# Patient Record
Sex: FEMALE
[De-identification: names, ages, dates, MRNs, and addresses within clinical notes are randomized per-mention and may not be internally consistent; named-entity substitution may affect disease eponyms.]

---

## 2023-01-03 PROBLEM — Z00.00 ENCOUNTER FOR PREVENTIVE HEALTH EXAMINATION: Status: ACTIVE | Noted: 2023-01-03

## 2023-01-12 ENCOUNTER — APPOINTMENT (OUTPATIENT)
Dept: UROLOGY | Facility: CLINIC | Age: 43
End: 2023-01-12

## 2023-01-18 ENCOUNTER — APPOINTMENT (OUTPATIENT)
Dept: UROLOGY | Facility: CLINIC | Age: 43
End: 2023-01-18
Payer: COMMERCIAL

## 2023-01-18 VITALS
HEIGHT: 69 IN | BODY MASS INDEX: 19.7 KG/M2 | RESPIRATION RATE: 16 BRPM | HEART RATE: 76 BPM | OXYGEN SATURATION: 99 % | SYSTOLIC BLOOD PRESSURE: 113 MMHG | WEIGHT: 133 LBS | DIASTOLIC BLOOD PRESSURE: 74 MMHG | TEMPERATURE: 98.2 F

## 2023-01-18 PROCEDURE — 99203 OFFICE O/P NEW LOW 30 MIN: CPT

## 2023-01-24 ENCOUNTER — APPOINTMENT (OUTPATIENT)
Dept: ENDOCRINOLOGY | Facility: CLINIC | Age: 43
End: 2023-01-24
Payer: COMMERCIAL

## 2023-01-24 VITALS
WEIGHT: 133 LBS | SYSTOLIC BLOOD PRESSURE: 110 MMHG | HEIGHT: 69 IN | TEMPERATURE: 98.1 F | HEART RATE: 78 BPM | BODY MASS INDEX: 19.7 KG/M2 | DIASTOLIC BLOOD PRESSURE: 78 MMHG | OXYGEN SATURATION: 98 %

## 2023-01-24 PROCEDURE — 99204 OFFICE O/P NEW MOD 45 MIN: CPT

## 2023-01-24 NOTE — HISTORY OF PRESENT ILLNESS
[FreeTextEntry1] : 42 year F here for assessment of adrenal adenoma: \par \par Date of Imagin2022\par Modality:  CT KUB, done in outside, left side 2 x 1.3 cm, 78% absolute washout and 48% relative washout\par \par Patient reports that [place]ings [place] when she had round face, thin legs, striae, and had right adrenal adenoma removed \par \par She had interval CT scan done which suggested now a left sided adenoma \par \par History of known prior/ current malignancy: No\par    \par Episodic headaches: No \par Episodic sweating: No\par Forceful palpitations: No\par Tremors: No\par Pallor: No\par Dyspnea: No\par Generalized weakness: No\par Panic attacks: No\par Weight loss: No\par Polyuria: No\par Polydipsia: No\par Constipation: No  \par Known history of elevations in blood pressure related to diagnostic procedures, anesthesia induction, surgery, with certain foods or beverages: No\par \par History of HTN: No\par Renal impairment: No \par \par Appreciable fat buildup in face, neck, back, abdomen, chest: No\par Arms and legs becoming thin: No\par Purple stretch marks: Yes, improving \par \par Women:  \par Hirsutism: No\par Acne: No\par Male-pattern baldness: No\par \par

## 2023-01-24 NOTE — PHYSICAL EXAM
[Alert] : alert [Well Nourished] : well nourished [No Acute Distress] : no acute distress [Well Developed] : well developed [Normal Sclera/Conjunctiva] : normal sclera/conjunctiva [EOMI] : extra ocular movement intact [No Proptosis] : no proptosis [Normal Oropharynx] : the oropharynx was normal [Thyroid Not Enlarged] : the thyroid was not enlarged [No Respiratory Distress] : no respiratory distress [No Accessory Muscle Use] : no accessory muscle use [Clear to Auscultation] : lungs were clear to auscultation bilaterally [Normal S1, S2] : normal S1 and S2 [Normal Rate] : heart rate was normal [Regular Rhythm] : with a regular rhythm [No Edema] : no peripheral edema [Normal Bowel Sounds] : normal bowel sounds [Pedal Pulses Normal] : the pedal pulses are present [Not Tender] : non-tender [Not Distended] : not distended [Soft] : abdomen soft [Normal Anterior Cervical Nodes] : no anterior cervical lymphadenopathy [Normal Posterior Cervical Nodes] : no posterior cervical lymphadenopathy [No Spinal Tenderness] : no spinal tenderness [Spine Straight] : spine straight [No Stigmata of Cushings Syndrome] : no stigmata of Cushings Syndrome [Normal Gait] : normal gait [Normal Strength/Tone] : muscle strength and tone were normal [No Rash] : no rash [Acanthosis Nigricans] : no acanthosis nigricans [Normal Reflexes] : deep tendon reflexes were 2+ and symmetric [No Tremors] : no tremors [Oriented x3] : oriented to person, place, and time

## 2023-01-25 ENCOUNTER — LABORATORY RESULT (OUTPATIENT)
Age: 43
End: 2023-01-25

## 2023-01-25 NOTE — HISTORY OF PRESENT ILLNESS
[FreeTextEntry1] : 43 yo presents today for an initial visit for a Bosniak III cyst\par she presented in [place] and had a CT scan performed and this was diagnosed incidentally\par She is healthy with no other problems\par Referred by Dr Castellon\par no hematuria\par no pain \par no family hx

## 2023-01-26 LAB
ALBUMIN SERPL ELPH-MCNC: 4.5 G/DL
ALDOSTERONE SERUM: 26.1 NG/DL
ALP BLD-CCNC: 65 U/L
ALT SERPL-CCNC: 11 U/L
ANION GAP SERPL CALC-SCNC: 11 MMOL/L
AST SERPL-CCNC: 16 U/L
BILIRUB SERPL-MCNC: 0.6 MG/DL
BUN SERPL-MCNC: 10 MG/DL
CALCIUM SERPL-MCNC: 10 MG/DL
CHLORIDE SERPL-SCNC: 106 MMOL/L
CO2 SERPL-SCNC: 23 MMOL/L
CORTIS SERPL-MCNC: 3.4 UG/DL
CREAT SERPL-MCNC: 0.85 MG/DL
EGFR: 87 ML/MIN/1.73M2
GLUCOSE SERPL-MCNC: 99 MG/DL
POTASSIUM SERPL-SCNC: 5.2 MMOL/L
PROT SERPL-MCNC: 6.5 G/DL
SODIUM SERPL-SCNC: 140 MMOL/L
TSH SERPL-ACNC: 1.12 UIU/ML

## 2023-01-28 ENCOUNTER — TRANSCRIPTION ENCOUNTER (OUTPATIENT)
Age: 43
End: 2023-01-28

## 2023-02-01 LAB — DEXAMETHASONE LEVEL: 116 NG/DL

## 2023-02-07 LAB
ACTH-ESO: 11 PG/ML
DHEA-SULFATE, SERUM: 24 UG/DL
RENIN ACTIVITY, PLASMA: 1.2 NG/ML/HR

## 2023-02-10 ENCOUNTER — APPOINTMENT (OUTPATIENT)
Dept: ENDOCRINOLOGY | Facility: CLINIC | Age: 43
End: 2023-02-10
Payer: COMMERCIAL

## 2023-02-10 VITALS
BODY MASS INDEX: 19.7 KG/M2 | HEIGHT: 69 IN | SYSTOLIC BLOOD PRESSURE: 90 MMHG | HEART RATE: 69 BPM | OXYGEN SATURATION: 99 % | DIASTOLIC BLOOD PRESSURE: 60 MMHG | WEIGHT: 133 LBS | TEMPERATURE: 98.1 F

## 2023-02-10 PROCEDURE — 99214 OFFICE O/P EST MOD 30 MIN: CPT

## 2023-02-10 NOTE — REASON FOR VISIT
[Adrenal Evaluation/Adrenal Disorder] : adrenal evaluation/adrenal disorder [Follow - Up] : a follow-up visit

## 2023-02-22 LAB
METANEPHRINE, PL: 10.6 PG/ML
NORMETANEPHRINE, PL: 73.1 PG/ML

## 2023-02-24 ENCOUNTER — OUTPATIENT (OUTPATIENT)
Dept: OUTPATIENT SERVICES | Facility: HOSPITAL | Age: 43
LOS: 1 days | End: 2023-02-24
Payer: COMMERCIAL

## 2023-02-24 ENCOUNTER — RESULT REVIEW (OUTPATIENT)
Age: 43
End: 2023-02-24

## 2023-02-24 VITALS
HEIGHT: 69 IN | WEIGHT: 130.07 LBS | SYSTOLIC BLOOD PRESSURE: 111 MMHG | RESPIRATION RATE: 16 BRPM | HEART RATE: 70 BPM | DIASTOLIC BLOOD PRESSURE: 80 MMHG | TEMPERATURE: 98 F | OXYGEN SATURATION: 100 %

## 2023-02-24 DIAGNOSIS — E89.6 POSTPROCEDURAL ADRENOCORTICAL (-MEDULLARY) HYPOFUNCTION: Chronic | ICD-10-CM

## 2023-02-24 DIAGNOSIS — Z01.818 ENCOUNTER FOR OTHER PREPROCEDURAL EXAMINATION: ICD-10-CM

## 2023-02-24 DIAGNOSIS — N28.89 OTHER SPECIFIED DISORDERS OF KIDNEY AND URETER: ICD-10-CM

## 2023-02-24 LAB
ABO RH CONFIRMATION: SIGNIFICANT CHANGE UP
ANION GAP SERPL CALC-SCNC: 3 MMOL/L — LOW (ref 5–17)
APPEARANCE UR: CLEAR — SIGNIFICANT CHANGE UP
APTT BLD: 34.4 SEC — SIGNIFICANT CHANGE UP (ref 27.5–35.5)
BACTERIA # UR AUTO: ABNORMAL
BASOPHILS # BLD AUTO: 0.07 K/UL — SIGNIFICANT CHANGE UP (ref 0–0.2)
BASOPHILS NFR BLD AUTO: 1.2 % — SIGNIFICANT CHANGE UP (ref 0–2)
BILIRUB UR-MCNC: NEGATIVE — SIGNIFICANT CHANGE UP
BLD GP AB SCN SERPL QL: SIGNIFICANT CHANGE UP
BUN SERPL-MCNC: 12 MG/DL — SIGNIFICANT CHANGE UP (ref 7–23)
CALCIUM SERPL-MCNC: 9.7 MG/DL — SIGNIFICANT CHANGE UP (ref 8.5–10.1)
CHLORIDE SERPL-SCNC: 107 MMOL/L — SIGNIFICANT CHANGE UP (ref 96–108)
CO2 SERPL-SCNC: 28 MMOL/L — SIGNIFICANT CHANGE UP (ref 22–31)
COLOR SPEC: YELLOW — SIGNIFICANT CHANGE UP
CREAT SERPL-MCNC: 0.78 MG/DL — SIGNIFICANT CHANGE UP (ref 0.5–1.3)
DIFF PNL FLD: ABNORMAL
EGFR: 96 ML/MIN/1.73M2 — SIGNIFICANT CHANGE UP
EOSINOPHIL # BLD AUTO: 0.16 K/UL — SIGNIFICANT CHANGE UP (ref 0–0.5)
EOSINOPHIL NFR BLD AUTO: 2.7 % — SIGNIFICANT CHANGE UP (ref 0–6)
EPI CELLS # UR: SIGNIFICANT CHANGE UP
GLUCOSE SERPL-MCNC: 79 MG/DL — SIGNIFICANT CHANGE UP (ref 70–99)
GLUCOSE UR QL: NEGATIVE — SIGNIFICANT CHANGE UP
HCT VFR BLD CALC: 40.7 % — SIGNIFICANT CHANGE UP (ref 34.5–45)
HGB BLD-MCNC: 13.4 G/DL — SIGNIFICANT CHANGE UP (ref 11.5–15.5)
IMM GRANULOCYTES NFR BLD AUTO: 0.3 % — SIGNIFICANT CHANGE UP (ref 0–0.9)
INR BLD: 1.01 RATIO — SIGNIFICANT CHANGE UP (ref 0.88–1.16)
KETONES UR-MCNC: NEGATIVE — SIGNIFICANT CHANGE UP
LEUKOCYTE ESTERASE UR-ACNC: ABNORMAL
LYMPHOCYTES # BLD AUTO: 2.1 K/UL — SIGNIFICANT CHANGE UP (ref 1–3.3)
LYMPHOCYTES # BLD AUTO: 34.9 % — SIGNIFICANT CHANGE UP (ref 13–44)
MCHC RBC-ENTMCNC: 31.4 PG — SIGNIFICANT CHANGE UP (ref 27–34)
MCHC RBC-ENTMCNC: 32.9 GM/DL — SIGNIFICANT CHANGE UP (ref 32–36)
MCV RBC AUTO: 95.3 FL — SIGNIFICANT CHANGE UP (ref 80–100)
MONOCYTES # BLD AUTO: 0.67 K/UL — SIGNIFICANT CHANGE UP (ref 0–0.9)
MONOCYTES NFR BLD AUTO: 11.1 % — SIGNIFICANT CHANGE UP (ref 2–14)
NEUTROPHILS # BLD AUTO: 3 K/UL — SIGNIFICANT CHANGE UP (ref 1.8–7.4)
NEUTROPHILS NFR BLD AUTO: 49.8 % — SIGNIFICANT CHANGE UP (ref 43–77)
NITRITE UR-MCNC: NEGATIVE — SIGNIFICANT CHANGE UP
PH UR: 7 — SIGNIFICANT CHANGE UP (ref 5–8)
PLATELET # BLD AUTO: 246 K/UL — SIGNIFICANT CHANGE UP (ref 150–400)
POTASSIUM SERPL-MCNC: 3.5 MMOL/L — SIGNIFICANT CHANGE UP (ref 3.5–5.3)
POTASSIUM SERPL-SCNC: 3.5 MMOL/L — SIGNIFICANT CHANGE UP (ref 3.5–5.3)
PROT UR-MCNC: NEGATIVE — SIGNIFICANT CHANGE UP
PROTHROM AB SERPL-ACNC: 11.7 SEC — SIGNIFICANT CHANGE UP (ref 10.5–13.4)
RBC # BLD: 4.27 M/UL — SIGNIFICANT CHANGE UP (ref 3.8–5.2)
RBC # FLD: 12.1 % — SIGNIFICANT CHANGE UP (ref 10.3–14.5)
RBC CASTS # UR COMP ASSIST: SIGNIFICANT CHANGE UP /HPF (ref 0–4)
SODIUM SERPL-SCNC: 138 MMOL/L — SIGNIFICANT CHANGE UP (ref 135–145)
SP GR SPEC: 1 — LOW (ref 1.01–1.02)
UROBILINOGEN FLD QL: NEGATIVE — SIGNIFICANT CHANGE UP
WBC # BLD: 6.02 K/UL — SIGNIFICANT CHANGE UP (ref 3.8–10.5)
WBC # FLD AUTO: 6.02 K/UL — SIGNIFICANT CHANGE UP (ref 3.8–10.5)
WBC UR QL: SIGNIFICANT CHANGE UP /HPF (ref 0–5)

## 2023-02-24 PROCEDURE — 86901 BLOOD TYPING SEROLOGIC RH(D): CPT

## 2023-02-24 PROCEDURE — 93005 ELECTROCARDIOGRAM TRACING: CPT

## 2023-02-24 PROCEDURE — 85610 PROTHROMBIN TIME: CPT

## 2023-02-24 PROCEDURE — 87086 URINE CULTURE/COLONY COUNT: CPT

## 2023-02-24 PROCEDURE — 86900 BLOOD TYPING SEROLOGIC ABO: CPT

## 2023-02-24 PROCEDURE — 81001 URINALYSIS AUTO W/SCOPE: CPT

## 2023-02-24 PROCEDURE — 99214 OFFICE O/P EST MOD 30 MIN: CPT | Mod: 25

## 2023-02-24 PROCEDURE — 93010 ELECTROCARDIOGRAM REPORT: CPT

## 2023-02-24 PROCEDURE — 71046 X-RAY EXAM CHEST 2 VIEWS: CPT | Mod: 26

## 2023-02-24 PROCEDURE — 80048 BASIC METABOLIC PNL TOTAL CA: CPT

## 2023-02-24 PROCEDURE — 85025 COMPLETE CBC W/AUTO DIFF WBC: CPT

## 2023-02-24 PROCEDURE — 71046 X-RAY EXAM CHEST 2 VIEWS: CPT

## 2023-02-24 PROCEDURE — 85730 THROMBOPLASTIN TIME PARTIAL: CPT

## 2023-02-24 PROCEDURE — 36415 COLL VENOUS BLD VENIPUNCTURE: CPT

## 2023-02-24 PROCEDURE — 86850 RBC ANTIBODY SCREEN: CPT

## 2023-02-24 NOTE — H&P PST ADULT - HISTORY OF PRESENT ILLNESS
42 year old female diagnosed with left renal mass presents to PST for planned left partial nephrectomy  42 year old female diagnosed with left renal mass; incidental finding; denies abdominal pain;  presents to PST for planned left partial nephrectomy

## 2023-02-24 NOTE — H&P PST ADULT - ASSESSMENT
Plan:  1. PST instructions given ; NPO status/  instructions to be given by ASU   2. Pt instructed to take following meds on day of surgery:   3. Pt instructed to take routine evening medications unless indicated   4. Stop NSAIDS ( Aspirin Alev Motrin Mobic Diclofenac), herbal supplements , MVI , Vitamin fish oil 7 days prior to surgery  unless   directed by surgeon or cardiologist;   5. Medical Optimization  with    6. EZ wash instructions given & mupirocin instructions given  7. Labs EKG CXR as per surgeon request   8. Pt instructed to self quarantine after Covid test   9. Covid Testing scheduled Pt notified and aware  10. Pt denies covid symptoms shortness of breath fever cough    42 year old female diagnosed with left renal mass; incidental finding; denies abdominal pain;  presents to PST for planned left partial nephrectomy       Plan:  1. PST instructions given ; NPO status/  instructions to be given by ASU   2. Pt instructed to take following meds on day of surgery: none  3. Pt instructed to take routine evening medications unless indicated   4. Stop NSAIDS ( Aspirin Alev Motrin Mobic Diclofenac), herbal supplements , MVI , Vitamin fish oil 7 days prior to surgery  unless   directed by surgeon or cardiologist;   5. Medical Optimization  with Dr Temple  6. EZ wash instructions given   7. Labs EKG CXR as per surgeon request   8. Pt instructed to self quarantine after Covid test   9. Covid Testing scheduled Pt notified and aware  10. Pt denies covid symptoms shortness of breath fever cough   11. Urine for pregnancy on day of surgery       CAPRINI SCORE [CLOT]    AGE RELATED RISK FACTORS                                                       MOBILITY RELATED FACTORS  [x ] Age 41-60 years                                            (1 Point)                  [ ] Bed rest                                                        (1 Point)  [ ] Age: 61-74 years                                           (2 Points)                 [ ] Plaster cast                                                   (2 Points)  [ ] Age= 75 years                                              (3 Points)                 [ ] Bed bound for more than 72 hours                 (2 Points)    DISEASE RELATED RISK FACTORS                                               GENDER SPECIFIC FACTORS  [ ] Edema in the lower extremities                       (1 Point)                  [ ] Pregnancy                                                     (1 Point)  [ ] Varicose veins                                               (1 Point)                  [ ] Post-partum < 6 weeks                                   (1 Point)             [ ] BMI > 25 Kg/m2                                            (1 Point)                  [ ] Hormonal therapy  or oral contraception          (1 Point)                 [ ] Sepsis (in the previous month)                        (1 Point)                  [ ] History of pregnancy complications                 (1 point)  [ ] Pneumonia or serious lung disease                                               [ ] Unexplained or recurrent                     (1 Point)           (in the previous month)                               (1 Point)  [ ] Abnormal pulmonary function test                     (1 Point)                 SURGERY RELATED RISK FACTORS  [ ] Acute myocardial infarction                              (1 Point)                 [ ]  Section                                             (1 Point)  [ ] Congestive heart failure (in the previous month)  (1 Point)               [ ] Minor surgery                                                  (1 Point)   [ ] Inflammatory bowel disease                             (1 Point)                 [ ] Arthroscopic surgery                                        (2 Points)  [ ] Central venous access                                      (2 Points)                [x ] General surgery lasting more than 45 minutes   (2 Points)       [ ] Stroke (in the previous month)                          (5 Points)               [ ] Elective arthroplasty                                         (5 Points)            (x ) presents and past malignancy                           (2 points)                                                                                                         HEMATOLOGY RELATED FACTORS                                                 TRAUMA RELATED RISK FACTORS  [ ] Prior episodes of VTE                                     (3 Points)                 [ ] Fracture of the hip, pelvis, or leg                       (5 Points)  [ ] Positive family history for VTE                         (3 Points)                 [ ] Acute spinal cord injury (in the previous month)  (5 Points)  [ ] Prothrombin 90020 A                                     (3 Points)                 [ ] Paralysis  (less than 1 month)                             (5 Points)  [ ] Factor V Leiden                                             (3 Points)                  [ ] Multiple Trauma within 1 month                        (5 Points)  [ ] Lupus anticoagulants                                     (3 Points)                                                           [ ] Anticardiolipin antibodies                               (3 Points)                                                       [ ] High homocysteine in the blood                      (3 Points)                                             [ ] Other congenital or acquired thrombophilia      (3 Points)                                                [ ] Heparin induced thrombocytopenia                  (3 Points)                                          Total Score [    5      ]    The Caprini score indicates this patient is at risk for a VTE event (score 3-5).  Most surgical patients in this group would benefit from pharmacologic prophylaxis.  The surgical team will determine the balance between VTE risk and bleeding risk

## 2023-02-24 NOTE — H&P PST ADULT - NSICDXFAMILYHX_GEN_ALL_CORE_FT
FAMILY HISTORY:  Father  Still living? Unknown  Family history of cirrhosis of liver, Age at diagnosis: Age Unknown

## 2023-02-24 NOTE — H&P PST ADULT - NSANTHOSAYNRD_GEN_A_CORE
No. MARIELLA screening performed.  STOP BANG Legend: 0-2 = LOW Risk; 3-4 = INTERMEDIATE Risk; 5-8 = HIGH Risk

## 2023-02-24 NOTE — H&P PST ADULT - NSICDXPASTMEDICALHX_GEN_ALL_CORE_FT
PAST MEDICAL HISTORY:  History of adrenal adenoma     Left renal mass      PAST MEDICAL HISTORY:  Adrenal adenoma, left     Adrenal adenoma, right     History of Cushing disease     Left renal mass     Neck pain     Scalp psoriasis

## 2023-02-25 DIAGNOSIS — N28.89 OTHER SPECIFIED DISORDERS OF KIDNEY AND URETER: ICD-10-CM

## 2023-02-25 DIAGNOSIS — Z01.818 ENCOUNTER FOR OTHER PREPROCEDURAL EXAMINATION: ICD-10-CM

## 2023-02-25 LAB
CULTURE RESULTS: SIGNIFICANT CHANGE UP
SPECIMEN SOURCE: SIGNIFICANT CHANGE UP

## 2023-03-01 LAB
CORTIS 24H UR-MCNC: 24 H
CORTIS 24H UR-MRATE: 11 MCG/24 H
SPECIMEN VOL 24H UR: 1450 ML

## 2023-03-01 NOTE — ADDENDUM
[FreeTextEntry1] : 41Y/O female with left sided 2 cm adrenal adenoma, with imaging phenotype suggestive of benign adrenal adenoma. Patient [place]x of [place]ushings [place], with right sided adenoma removal > 5 years ago. 1mg DST did not demonstrate full suppression. Clinically does not appear cushingoid, possibly false +ve vs subclinical cushings. \par Not hypertensive with normal electrolytes, and renin not suppressed hence low suspicion for primary aldosterone excess. Plasma metanephrines within normal limits and not suggestive of catecholamine excess. \par \par \par Patient currently stable from Endocrine standpoint to proceed to elective urologic procedure\par \par \par -will continue f/up in office post procedure for interval monitoring of adrenal nodule as above. \par \par \par \par \par \par 03/01/2023  8:46 AM \par \par \par Contacted Ms. AZUCENA HOPKINS  at telephone number listed on file to review results of 24hr urine  done on 2/21/23. Patient agrees to review over phone\par \par 24Hr urine cortisol WNL, and not suggestive of cortisol excess\par \par likely non-functional adrenal adenoma, with benign imaging phenotype\par \par -repeat CT scan adrenal protocol 1/2024 or earlier if indicated\par -repeat hormonal profile 1/2024, or sooner if new clinical features\par \par f/up 1/2024\par     \par

## 2023-03-01 NOTE — HISTORY OF PRESENT ILLNESS
[FreeTextEntry1] : 42 year F here for f/up of adrenal adenoma: \par \par Summary:\par Date of Imagin2022\par Modality:  CT KUB, done in outside, left side 2 x 1.3 cm, 78% absolute washout and 48% relative washout\par \par Patient reports that she [place] when she had round face, thin legs, striae, and had right adrenal adenoma removed \par \par She had interval CT scan done which suggested now a left sided adenoma \par \par \par History of HTN: No\par \par Appreciable fat buildup in face, neck, back, abdomen, chest: No\par Arms and legs becoming thin: No\par Purple stretch marks: Yes, improving \par Hirsutism: No\par Acne: No\par Male-pattern baldness: No\par \par At last visit repeat labs were ordered, including 1mg DST\par \par

## 2023-03-01 NOTE — PHYSICAL EXAM
[Alert] : alert [Well Nourished] : well nourished [No Acute Distress] : no acute distress [Well Developed] : well developed [Normal Sclera/Conjunctiva] : normal sclera/conjunctiva [EOMI] : extra ocular movement intact [No Proptosis] : no proptosis [Normal Oropharynx] : the oropharynx was normal [Thyroid Not Enlarged] : the thyroid was not enlarged [No Respiratory Distress] : no respiratory distress [No Accessory Muscle Use] : no accessory muscle use [Clear to Auscultation] : lungs were clear to auscultation bilaterally [Normal S1, S2] : normal S1 and S2 [Normal Rate] : heart rate was normal [Regular Rhythm] : with a regular rhythm [No Edema] : no peripheral edema [Pedal Pulses Normal] : the pedal pulses are present [Normal Bowel Sounds] : normal bowel sounds [Not Tender] : non-tender [Not Distended] : not distended [Soft] : abdomen soft [Normal Anterior Cervical Nodes] : no anterior cervical lymphadenopathy [No Spinal Tenderness] : no spinal tenderness [Spine Straight] : spine straight [No Stigmata of Cushings Syndrome] : no stigmata of Cushings Syndrome [Normal Gait] : normal gait [Normal Strength/Tone] : muscle strength and tone were normal [No Rash] : no rash [Normal Reflexes] : deep tendon reflexes were 2+ and symmetric [No Tremors] : no tremors [Oriented x3] : oriented to person, place, and time [Acanthosis Nigricans] : no acanthosis nigricans

## 2023-03-02 LAB — BACTERIA UR CULT: NORMAL

## 2023-03-03 PROBLEM — M54.2 CERVICALGIA: Chronic | Status: ACTIVE | Noted: 2023-02-24

## 2023-03-03 PROBLEM — L40.9 PSORIASIS, UNSPECIFIED: Chronic | Status: ACTIVE | Noted: 2023-02-24

## 2023-03-03 PROBLEM — D35.01 BENIGN NEOPLASM OF RIGHT ADRENAL GLAND: Chronic | Status: ACTIVE | Noted: 2023-02-24

## 2023-03-03 PROBLEM — D35.02 BENIGN NEOPLASM OF LEFT ADRENAL GLAND: Chronic | Status: ACTIVE | Noted: 2023-02-24

## 2023-03-03 PROBLEM — Z86.39 PERSONAL HISTORY OF OTHER ENDOCRINE, NUTRITIONAL AND METABOLIC DISEASE: Chronic | Status: ACTIVE | Noted: 2023-02-24

## 2023-03-03 PROBLEM — N28.89 OTHER SPECIFIED DISORDERS OF KIDNEY AND URETER: Chronic | Status: ACTIVE | Noted: 2023-02-24

## 2023-03-07 ENCOUNTER — APPOINTMENT (OUTPATIENT)
Dept: UROLOGY | Facility: HOSPITAL | Age: 43
End: 2023-03-07

## 2023-03-07 ENCOUNTER — INPATIENT (INPATIENT)
Facility: HOSPITAL | Age: 43
LOS: 1 days | Discharge: HOME | DRG: 688 | End: 2023-03-09
Attending: UROLOGY | Admitting: UROLOGY
Payer: COMMERCIAL

## 2023-03-07 ENCOUNTER — RESULT REVIEW (OUTPATIENT)
Age: 43
End: 2023-03-07

## 2023-03-07 VITALS
HEART RATE: 72 BPM | DIASTOLIC BLOOD PRESSURE: 64 MMHG | TEMPERATURE: 98 F | OXYGEN SATURATION: 100 % | HEIGHT: 69 IN | RESPIRATION RATE: 16 BRPM | SYSTOLIC BLOOD PRESSURE: 106 MMHG | WEIGHT: 130.07 LBS

## 2023-03-07 DIAGNOSIS — N28.89 OTHER SPECIFIED DISORDERS OF KIDNEY AND URETER: ICD-10-CM

## 2023-03-07 DIAGNOSIS — E89.6 POSTPROCEDURAL ADRENOCORTICAL (-MEDULLARY) HYPOFUNCTION: Chronic | ICD-10-CM

## 2023-03-07 LAB
ANION GAP SERPL CALC-SCNC: 4 MMOL/L — LOW (ref 5–17)
BASOPHILS # BLD AUTO: 0.06 K/UL — SIGNIFICANT CHANGE UP (ref 0–0.2)
BASOPHILS NFR BLD AUTO: 0.6 % — SIGNIFICANT CHANGE UP (ref 0–2)
BUN SERPL-MCNC: 11 MG/DL — SIGNIFICANT CHANGE UP (ref 7–23)
CALCIUM SERPL-MCNC: 9 MG/DL — SIGNIFICANT CHANGE UP (ref 8.5–10.1)
CHLORIDE SERPL-SCNC: 110 MMOL/L — HIGH (ref 96–108)
CO2 SERPL-SCNC: 25 MMOL/L — SIGNIFICANT CHANGE UP (ref 22–31)
CREAT SERPL-MCNC: 0.93 MG/DL — SIGNIFICANT CHANGE UP (ref 0.5–1.3)
EGFR: 78 ML/MIN/1.73M2 — SIGNIFICANT CHANGE UP
EOSINOPHIL # BLD AUTO: 0.02 K/UL — SIGNIFICANT CHANGE UP (ref 0–0.5)
EOSINOPHIL NFR BLD AUTO: 0.2 % — SIGNIFICANT CHANGE UP (ref 0–6)
GLUCOSE SERPL-MCNC: 105 MG/DL — HIGH (ref 70–99)
HCG UR QL: NEGATIVE — SIGNIFICANT CHANGE UP
HCT VFR BLD CALC: 39.3 % — SIGNIFICANT CHANGE UP (ref 34.5–45)
HGB BLD-MCNC: 12.9 G/DL — SIGNIFICANT CHANGE UP (ref 11.5–15.5)
IMM GRANULOCYTES NFR BLD AUTO: 0.4 % — SIGNIFICANT CHANGE UP (ref 0–0.9)
LYMPHOCYTES # BLD AUTO: 0.74 K/UL — LOW (ref 1–3.3)
LYMPHOCYTES # BLD AUTO: 7.1 % — LOW (ref 13–44)
MCHC RBC-ENTMCNC: 31.2 PG — SIGNIFICANT CHANGE UP (ref 27–34)
MCHC RBC-ENTMCNC: 32.8 GM/DL — SIGNIFICANT CHANGE UP (ref 32–36)
MCV RBC AUTO: 94.9 FL — SIGNIFICANT CHANGE UP (ref 80–100)
MONOCYTES # BLD AUTO: 0.13 K/UL — SIGNIFICANT CHANGE UP (ref 0–0.9)
MONOCYTES NFR BLD AUTO: 1.3 % — LOW (ref 2–14)
NEUTROPHILS # BLD AUTO: 9.39 K/UL — HIGH (ref 1.8–7.4)
NEUTROPHILS NFR BLD AUTO: 90.4 % — HIGH (ref 43–77)
PLATELET # BLD AUTO: 190 K/UL — SIGNIFICANT CHANGE UP (ref 150–400)
POTASSIUM SERPL-MCNC: 4.6 MMOL/L — SIGNIFICANT CHANGE UP (ref 3.5–5.3)
POTASSIUM SERPL-SCNC: 4.6 MMOL/L — SIGNIFICANT CHANGE UP (ref 3.5–5.3)
RBC # BLD: 4.14 M/UL — SIGNIFICANT CHANGE UP (ref 3.8–5.2)
RBC # FLD: 11.9 % — SIGNIFICANT CHANGE UP (ref 10.3–14.5)
SODIUM SERPL-SCNC: 139 MMOL/L — SIGNIFICANT CHANGE UP (ref 135–145)
WBC # BLD: 10.38 K/UL — SIGNIFICANT CHANGE UP (ref 3.8–10.5)
WBC # FLD AUTO: 10.38 K/UL — SIGNIFICANT CHANGE UP (ref 3.8–10.5)

## 2023-03-07 PROCEDURE — 85025 COMPLETE CBC W/AUTO DIFF WBC: CPT

## 2023-03-07 PROCEDURE — 80048 BASIC METABOLIC PNL TOTAL CA: CPT

## 2023-03-07 PROCEDURE — 84100 ASSAY OF PHOSPHORUS: CPT

## 2023-03-07 PROCEDURE — 85027 COMPLETE CBC AUTOMATED: CPT

## 2023-03-07 PROCEDURE — 88307 TISSUE EXAM BY PATHOLOGIST: CPT

## 2023-03-07 PROCEDURE — 50543 LAPARO PARTIAL NEPHRECTOMY: CPT | Mod: LT

## 2023-03-07 PROCEDURE — C1889: CPT

## 2023-03-07 PROCEDURE — 83735 ASSAY OF MAGNESIUM: CPT

## 2023-03-07 PROCEDURE — 36415 COLL VENOUS BLD VENIPUNCTURE: CPT

## 2023-03-07 PROCEDURE — 50543 LAPARO PARTIAL NEPHRECTOMY: CPT | Mod: AS,LT

## 2023-03-07 PROCEDURE — C9113: CPT

## 2023-03-07 PROCEDURE — 88307 TISSUE EXAM BY PATHOLOGIST: CPT | Mod: 26

## 2023-03-07 PROCEDURE — 76998 US GUIDE INTRAOP: CPT | Mod: 26,LT

## 2023-03-07 PROCEDURE — S2900: CPT

## 2023-03-07 PROCEDURE — 81025 URINE PREGNANCY TEST: CPT

## 2023-03-07 RX ORDER — MORPHINE SULFATE 50 MG/1
4 CAPSULE, EXTENDED RELEASE ORAL EVERY 4 HOURS
Refills: 0 | Status: DISCONTINUED | OUTPATIENT
Start: 2023-03-07 | End: 2023-03-09

## 2023-03-07 RX ORDER — ONDANSETRON 8 MG/1
4 TABLET, FILM COATED ORAL EVERY 6 HOURS
Refills: 0 | Status: DISCONTINUED | OUTPATIENT
Start: 2023-03-07 | End: 2023-03-09

## 2023-03-07 RX ORDER — SODIUM CHLORIDE 9 MG/ML
1000 INJECTION INTRAMUSCULAR; INTRAVENOUS; SUBCUTANEOUS
Refills: 0 | Status: DISCONTINUED | OUTPATIENT
Start: 2023-03-07 | End: 2023-03-09

## 2023-03-07 RX ORDER — ONDANSETRON 8 MG/1
4 TABLET, FILM COATED ORAL ONCE
Refills: 0 | Status: DISCONTINUED | OUTPATIENT
Start: 2023-03-07 | End: 2023-03-07

## 2023-03-07 RX ORDER — SODIUM CHLORIDE 9 MG/ML
1000 INJECTION, SOLUTION INTRAVENOUS
Refills: 0 | Status: DISCONTINUED | OUTPATIENT
Start: 2023-03-07 | End: 2023-03-07

## 2023-03-07 RX ORDER — OXYCODONE HYDROCHLORIDE 5 MG/1
5 TABLET ORAL EVERY 6 HOURS
Refills: 0 | Status: DISCONTINUED | OUTPATIENT
Start: 2023-03-07 | End: 2023-03-09

## 2023-03-07 RX ORDER — OXYCODONE HYDROCHLORIDE 5 MG/1
5 TABLET ORAL ONCE
Refills: 0 | Status: DISCONTINUED | OUTPATIENT
Start: 2023-03-07 | End: 2023-03-07

## 2023-03-07 RX ORDER — HEPARIN SODIUM 5000 [USP'U]/ML
5000 INJECTION INTRAVENOUS; SUBCUTANEOUS EVERY 8 HOURS
Refills: 0 | Status: DISCONTINUED | OUTPATIENT
Start: 2023-03-07 | End: 2023-03-09

## 2023-03-07 RX ORDER — CEFAZOLIN SODIUM 1 G
2000 VIAL (EA) INJECTION EVERY 8 HOURS
Refills: 0 | Status: COMPLETED | OUTPATIENT
Start: 2023-03-07 | End: 2023-03-08

## 2023-03-07 RX ORDER — FENTANYL CITRATE 50 UG/ML
50 INJECTION INTRAVENOUS
Refills: 0 | Status: DISCONTINUED | OUTPATIENT
Start: 2023-03-07 | End: 2023-03-07

## 2023-03-07 RX ADMIN — SODIUM CHLORIDE 125 MILLILITER(S): 9 INJECTION INTRAMUSCULAR; INTRAVENOUS; SUBCUTANEOUS at 18:50

## 2023-03-07 RX ADMIN — OXYCODONE HYDROCHLORIDE 5 MILLIGRAM(S): 5 TABLET ORAL at 15:23

## 2023-03-07 RX ADMIN — MORPHINE SULFATE 4 MILLIGRAM(S): 50 CAPSULE, EXTENDED RELEASE ORAL at 23:18

## 2023-03-07 RX ADMIN — Medication 100 MILLIGRAM(S): at 21:27

## 2023-03-07 RX ADMIN — MORPHINE SULFATE 4 MILLIGRAM(S): 50 CAPSULE, EXTENDED RELEASE ORAL at 18:18

## 2023-03-07 RX ADMIN — FENTANYL CITRATE 50 MICROGRAM(S): 50 INJECTION INTRAVENOUS at 15:44

## 2023-03-07 RX ADMIN — MORPHINE SULFATE 4 MILLIGRAM(S): 50 CAPSULE, EXTENDED RELEASE ORAL at 18:03

## 2023-03-07 RX ADMIN — HEPARIN SODIUM 5000 UNIT(S): 5000 INJECTION INTRAVENOUS; SUBCUTANEOUS at 21:27

## 2023-03-07 RX ADMIN — OXYCODONE HYDROCHLORIDE 5 MILLIGRAM(S): 5 TABLET ORAL at 15:44

## 2023-03-07 RX ADMIN — FENTANYL CITRATE 50 MICROGRAM(S): 50 INJECTION INTRAVENOUS at 15:25

## 2023-03-07 RX ADMIN — FENTANYL CITRATE 50 MICROGRAM(S): 50 INJECTION INTRAVENOUS at 16:51

## 2023-03-07 RX ADMIN — FENTANYL CITRATE 50 MICROGRAM(S): 50 INJECTION INTRAVENOUS at 16:45

## 2023-03-07 RX ADMIN — MORPHINE SULFATE 4 MILLIGRAM(S): 50 CAPSULE, EXTENDED RELEASE ORAL at 22:48

## 2023-03-07 RX ADMIN — ONDANSETRON 4 MILLIGRAM(S): 8 TABLET, FILM COATED ORAL at 22:17

## 2023-03-07 NOTE — PATIENT PROFILE ADULT - FALL HARM RISK - PATIENT NEEDS ASSISTANCE
DATE OF ADMISSION:  03/01/2017    HISTORY OF PRESENT ILLNESS:  The patient is a 89-year-old white male with history of known CAD, history of COPD, history of CABG, history of PCI stent; the patient now presents with increasing shortness of breath at rest, having been treated for a COPD exacerbation as outpatient with oral prednisone and oral antibiotics.  The patient still has productive cough and fever, increased weakness, decreased p.o. intake.  Chest x-ray revealing no significant pneumonia.    MEDICATIONS:  Include Lasix 20 b.i.d., prednisone 20 b.i.d., Keflex 500 t.i.d., Ambien 5 p.r.n. q.h.s., aspirin daily, Ventolin inhaler t.i.d.    SOCIAL HISTORY:  Present nonsmoker and nondrinker.  No significant hereditary familial disease.    FAMILY HISTORY:  No family history of CAD or CVA.    PAST MEDICAL HISTORY:  DVT, history of PE approximately 1 year ago, history of MI, status post _______, cataract surgery, history of hypertension, and history of hyperlipidemia.    REVIEW OF SYSTEMS:  Recent fevers as noted above.  No chills.  10 pound weight loss over the last 1 year.    PHYSICAL EXAMINATION:    VITAL SIGNS:  /70, pulse 90, respirations 20, 102 temp in the ER noted orally.       HEAD AND NECK:  Unremarkable.       LUNGS:  Decreased breath sounds.  Crackles at the bases.  Increased wheezing inspiratory.     HEART:  Regular rate and rhythm.  No murmur.  Tachy.       ABDOMEN:  Soft, nontender.       EXTREMITIES:  Without edema.     GENERAL:  Alert and oriented x3.  Mild dementia.     MENTAL STATUS:  No focal neurological deficits.  Gait is not assessed secondary to shortness of breath.  Sensory exam grossly intact to light touch, upper and lower extremities.  Motor is 5-/5 in the upper extremities, 5-/5 on the lower extremities.    ASSESSMENT AND PLAN:    1. Chronic obstructive pulmonary disease exacerbation.  We will give IV steroids, IV antibiotics.  Follow up chest x-ray.  Keith.  Pulmonary to  see in consultation.    2. History of known coronary artery disease.  Monitor on telemetry.  Review last echocardiogram.  Cardiology did also seen in consultation.    3. History of deep venous thrombosis and pulmonary embolism.  Follow up venous Doppler once stable.  4. Hyperlipidemia, statin therapy.    5. Deep venous thrombosis prophylaxis, SCD boots bilateral at present.        ______________________________  Wilton Beverly MD  [id number]      D:  03/02/2017 09:21:08  T:  03/02/2017 10:04:38   /modl   Job:  [id number]          No assistance needed

## 2023-03-07 NOTE — PATIENT PROFILE ADULT - HOW PATIENT ADDRESSED, PROFILE
Perla Hydroxyzine Pregnancy And Lactation Text: This medication is not safe during pregnancy and should not be taken. It is also excreted in breast milk and breast feeding isn't recommended.

## 2023-03-08 ENCOUNTER — APPOINTMENT (OUTPATIENT)
Dept: UROLOGY | Facility: HOSPITAL | Age: 43
End: 2023-03-08

## 2023-03-08 LAB
ANION GAP SERPL CALC-SCNC: 5 MMOL/L — SIGNIFICANT CHANGE UP (ref 5–17)
BASOPHILS # BLD AUTO: 0.02 K/UL — SIGNIFICANT CHANGE UP (ref 0–0.2)
BASOPHILS NFR BLD AUTO: 0.2 % — SIGNIFICANT CHANGE UP (ref 0–2)
BUN SERPL-MCNC: 12 MG/DL — SIGNIFICANT CHANGE UP (ref 7–23)
CALCIUM SERPL-MCNC: 8.2 MG/DL — LOW (ref 8.5–10.1)
CHLORIDE SERPL-SCNC: 112 MMOL/L — HIGH (ref 96–108)
CO2 SERPL-SCNC: 23 MMOL/L — SIGNIFICANT CHANGE UP (ref 22–31)
CREAT SERPL-MCNC: 0.81 MG/DL — SIGNIFICANT CHANGE UP (ref 0.5–1.3)
EGFR: 92 ML/MIN/1.73M2 — SIGNIFICANT CHANGE UP
EOSINOPHIL # BLD AUTO: 0.01 K/UL — SIGNIFICANT CHANGE UP (ref 0–0.5)
EOSINOPHIL NFR BLD AUTO: 0.1 % — SIGNIFICANT CHANGE UP (ref 0–6)
GLUCOSE SERPL-MCNC: 116 MG/DL — HIGH (ref 70–99)
HCT VFR BLD CALC: 32.2 % — LOW (ref 34.5–45)
HGB BLD-MCNC: 10.9 G/DL — LOW (ref 11.5–15.5)
IMM GRANULOCYTES NFR BLD AUTO: 0.6 % — SIGNIFICANT CHANGE UP (ref 0–0.9)
LYMPHOCYTES # BLD AUTO: 1.19 K/UL — SIGNIFICANT CHANGE UP (ref 1–3.3)
LYMPHOCYTES # BLD AUTO: 13.3 % — SIGNIFICANT CHANGE UP (ref 13–44)
MCHC RBC-ENTMCNC: 31.5 PG — SIGNIFICANT CHANGE UP (ref 27–34)
MCHC RBC-ENTMCNC: 33.9 GM/DL — SIGNIFICANT CHANGE UP (ref 32–36)
MCV RBC AUTO: 93.1 FL — SIGNIFICANT CHANGE UP (ref 80–100)
MONOCYTES # BLD AUTO: 0.74 K/UL — SIGNIFICANT CHANGE UP (ref 0–0.9)
MONOCYTES NFR BLD AUTO: 8.3 % — SIGNIFICANT CHANGE UP (ref 2–14)
NEUTROPHILS # BLD AUTO: 6.92 K/UL — SIGNIFICANT CHANGE UP (ref 1.8–7.4)
NEUTROPHILS NFR BLD AUTO: 77.5 % — HIGH (ref 43–77)
PLATELET # BLD AUTO: 184 K/UL — SIGNIFICANT CHANGE UP (ref 150–400)
POTASSIUM SERPL-MCNC: 4.1 MMOL/L — SIGNIFICANT CHANGE UP (ref 3.5–5.3)
POTASSIUM SERPL-SCNC: 4.1 MMOL/L — SIGNIFICANT CHANGE UP (ref 3.5–5.3)
RBC # BLD: 3.46 M/UL — LOW (ref 3.8–5.2)
RBC # FLD: 11.9 % — SIGNIFICANT CHANGE UP (ref 10.3–14.5)
SARS-COV-2 N GENE NPH QL NAA+PROBE: NOT DETECTED
SODIUM SERPL-SCNC: 140 MMOL/L — SIGNIFICANT CHANGE UP (ref 135–145)
WBC # BLD: 8.93 K/UL — SIGNIFICANT CHANGE UP (ref 3.8–10.5)
WBC # FLD AUTO: 8.93 K/UL — SIGNIFICANT CHANGE UP (ref 3.8–10.5)

## 2023-03-08 PROCEDURE — 99232 SBSQ HOSP IP/OBS MODERATE 35: CPT

## 2023-03-08 RX ORDER — PANTOPRAZOLE SODIUM 20 MG/1
40 TABLET, DELAYED RELEASE ORAL ONCE
Refills: 0 | Status: COMPLETED | OUTPATIENT
Start: 2023-03-08 | End: 2023-03-08

## 2023-03-08 RX ORDER — ACETAMINOPHEN 500 MG
1000 TABLET ORAL ONCE
Refills: 0 | Status: COMPLETED | OUTPATIENT
Start: 2023-03-08 | End: 2023-03-08

## 2023-03-08 RX ORDER — ACETAMINOPHEN 500 MG
1000 TABLET ORAL ONCE
Refills: 0 | Status: DISCONTINUED | OUTPATIENT
Start: 2023-03-08 | End: 2023-03-09

## 2023-03-08 RX ADMIN — Medication 400 MILLIGRAM(S): at 17:23

## 2023-03-08 RX ADMIN — MORPHINE SULFATE 4 MILLIGRAM(S): 50 CAPSULE, EXTENDED RELEASE ORAL at 11:34

## 2023-03-08 RX ADMIN — MORPHINE SULFATE 4 MILLIGRAM(S): 50 CAPSULE, EXTENDED RELEASE ORAL at 11:04

## 2023-03-08 RX ADMIN — OXYCODONE HYDROCHLORIDE 5 MILLIGRAM(S): 5 TABLET ORAL at 06:29

## 2023-03-08 RX ADMIN — SODIUM CHLORIDE 125 MILLILITER(S): 9 INJECTION INTRAMUSCULAR; INTRAVENOUS; SUBCUTANEOUS at 13:05

## 2023-03-08 RX ADMIN — HEPARIN SODIUM 5000 UNIT(S): 5000 INJECTION INTRAVENOUS; SUBCUTANEOUS at 05:48

## 2023-03-08 RX ADMIN — Medication 1000 MILLIGRAM(S): at 17:45

## 2023-03-08 RX ADMIN — OXYCODONE HYDROCHLORIDE 5 MILLIGRAM(S): 5 TABLET ORAL at 21:41

## 2023-03-08 RX ADMIN — Medication 100 MILLIGRAM(S): at 05:49

## 2023-03-08 RX ADMIN — PANTOPRAZOLE SODIUM 40 MILLIGRAM(S): 20 TABLET, DELAYED RELEASE ORAL at 12:33

## 2023-03-08 RX ADMIN — HEPARIN SODIUM 5000 UNIT(S): 5000 INJECTION INTRAVENOUS; SUBCUTANEOUS at 14:13

## 2023-03-08 RX ADMIN — SODIUM CHLORIDE 125 MILLILITER(S): 9 INJECTION INTRAMUSCULAR; INTRAVENOUS; SUBCUTANEOUS at 12:36

## 2023-03-08 RX ADMIN — HEPARIN SODIUM 5000 UNIT(S): 5000 INJECTION INTRAVENOUS; SUBCUTANEOUS at 21:14

## 2023-03-08 RX ADMIN — OXYCODONE HYDROCHLORIDE 5 MILLIGRAM(S): 5 TABLET ORAL at 14:13

## 2023-03-08 RX ADMIN — OXYCODONE HYDROCHLORIDE 5 MILLIGRAM(S): 5 TABLET ORAL at 14:43

## 2023-03-08 RX ADMIN — OXYCODONE HYDROCHLORIDE 5 MILLIGRAM(S): 5 TABLET ORAL at 05:52

## 2023-03-08 RX ADMIN — SODIUM CHLORIDE 125 MILLILITER(S): 9 INJECTION INTRAMUSCULAR; INTRAVENOUS; SUBCUTANEOUS at 03:00

## 2023-03-08 RX ADMIN — OXYCODONE HYDROCHLORIDE 5 MILLIGRAM(S): 5 TABLET ORAL at 21:11

## 2023-03-08 NOTE — PROVIDER CONTACT NOTE (OTHER) - SITUATION
Pt Perla Boudreaux 102-2, Pt reported new right shoulder pain that migrates to the arm,. Pt denied chest pain, left shoulder pain or back pain as well as any trouble breathing or lower extremity pain.

## 2023-03-08 NOTE — PROVIDER CONTACT NOTE (OTHER) - ACTION/TREATMENT ORDERED:
Surgical TARAS Beasley stated to give the pt PRN morphine as the pain most likely positional due to the position of the pt during her procedure.

## 2023-03-09 ENCOUNTER — TRANSCRIPTION ENCOUNTER (OUTPATIENT)
Age: 43
End: 2023-03-09

## 2023-03-09 VITALS
DIASTOLIC BLOOD PRESSURE: 77 MMHG | HEART RATE: 104 BPM | SYSTOLIC BLOOD PRESSURE: 121 MMHG | TEMPERATURE: 100 F | RESPIRATION RATE: 18 BRPM | OXYGEN SATURATION: 100 %

## 2023-03-09 LAB
ANION GAP SERPL CALC-SCNC: 3 MMOL/L — LOW (ref 5–17)
BUN SERPL-MCNC: 5 MG/DL — LOW (ref 7–23)
CALCIUM SERPL-MCNC: 8.5 MG/DL — SIGNIFICANT CHANGE UP (ref 8.5–10.1)
CHLORIDE SERPL-SCNC: 112 MMOL/L — HIGH (ref 96–108)
CO2 SERPL-SCNC: 26 MMOL/L — SIGNIFICANT CHANGE UP (ref 22–31)
CREAT SERPL-MCNC: 0.63 MG/DL — SIGNIFICANT CHANGE UP (ref 0.5–1.3)
EGFR: 112 ML/MIN/1.73M2 — SIGNIFICANT CHANGE UP
GLUCOSE SERPL-MCNC: 101 MG/DL — HIGH (ref 70–99)
HCT VFR BLD CALC: 33.8 % — LOW (ref 34.5–45)
HGB BLD-MCNC: 11.3 G/DL — LOW (ref 11.5–15.5)
MAGNESIUM SERPL-MCNC: 1.9 MG/DL — SIGNIFICANT CHANGE UP (ref 1.6–2.6)
MCHC RBC-ENTMCNC: 31.4 PG — SIGNIFICANT CHANGE UP (ref 27–34)
MCHC RBC-ENTMCNC: 33.4 GM/DL — SIGNIFICANT CHANGE UP (ref 32–36)
MCV RBC AUTO: 93.9 FL — SIGNIFICANT CHANGE UP (ref 80–100)
PHOSPHATE SERPL-MCNC: 2.2 MG/DL — LOW (ref 2.5–4.5)
PLATELET # BLD AUTO: 150 K/UL — SIGNIFICANT CHANGE UP (ref 150–400)
POTASSIUM SERPL-MCNC: 3.5 MMOL/L — SIGNIFICANT CHANGE UP (ref 3.5–5.3)
POTASSIUM SERPL-SCNC: 3.5 MMOL/L — SIGNIFICANT CHANGE UP (ref 3.5–5.3)
RBC # BLD: 3.6 M/UL — LOW (ref 3.8–5.2)
RBC # FLD: 12.1 % — SIGNIFICANT CHANGE UP (ref 10.3–14.5)
SODIUM SERPL-SCNC: 141 MMOL/L — SIGNIFICANT CHANGE UP (ref 135–145)
SURGICAL PATHOLOGY STUDY: SIGNIFICANT CHANGE UP
WBC # BLD: 6.15 K/UL — SIGNIFICANT CHANGE UP (ref 3.8–10.5)
WBC # FLD AUTO: 6.15 K/UL — SIGNIFICANT CHANGE UP (ref 3.8–10.5)

## 2023-03-09 PROCEDURE — 99239 HOSP IP/OBS DSCHRG MGMT >30: CPT

## 2023-03-09 RX ORDER — OXYCODONE HYDROCHLORIDE 5 MG/1
1 TABLET ORAL
Qty: 1 | Refills: 0
Start: 2023-03-09

## 2023-03-09 RX ORDER — ACETAMINOPHEN 500 MG
1000 TABLET ORAL ONCE
Refills: 0 | Status: COMPLETED | OUTPATIENT
Start: 2023-03-09 | End: 2023-03-09

## 2023-03-09 RX ORDER — OXYCODONE HYDROCHLORIDE 5 MG/1
1 TABLET ORAL
Qty: 20 | Refills: 0
Start: 2023-03-09 | End: 2023-03-13

## 2023-03-09 RX ORDER — NORTRIPTYLINE HYDROCHLORIDE 10 MG/1
1 CAPSULE ORAL
Qty: 0 | Refills: 0 | DISCHARGE

## 2023-03-09 RX ORDER — MULTIVIT-MIN/FERROUS GLUCONATE 9 MG/15 ML
1 LIQUID (ML) ORAL
Qty: 0 | Refills: 0 | DISCHARGE

## 2023-03-09 RX ORDER — ACETAMINOPHEN 500 MG
650 TABLET ORAL ONCE
Refills: 0 | Status: COMPLETED | OUTPATIENT
Start: 2023-03-09 | End: 2023-03-09

## 2023-03-09 RX ORDER — POTASSIUM PHOSPHATE, MONOBASIC POTASSIUM PHOSPHATE, DIBASIC 236; 224 MG/ML; MG/ML
30 INJECTION, SOLUTION INTRAVENOUS ONCE
Refills: 0 | Status: COMPLETED | OUTPATIENT
Start: 2023-03-09 | End: 2023-03-09

## 2023-03-09 RX ORDER — ACETAMINOPHEN 500 MG
650 TABLET ORAL ONCE
Refills: 0 | Status: COMPLETED | OUTPATIENT
Start: 2023-03-09 | End: 2024-02-05

## 2023-03-09 RX ADMIN — POTASSIUM PHOSPHATE, MONOBASIC POTASSIUM PHOSPHATE, DIBASIC 83.33 MILLIMOLE(S): 236; 224 INJECTION, SOLUTION INTRAVENOUS at 12:57

## 2023-03-09 RX ADMIN — Medication 650 MILLIGRAM(S): at 16:31

## 2023-03-09 RX ADMIN — SODIUM CHLORIDE 125 MILLILITER(S): 9 INJECTION INTRAMUSCULAR; INTRAVENOUS; SUBCUTANEOUS at 08:29

## 2023-03-09 RX ADMIN — HEPARIN SODIUM 5000 UNIT(S): 5000 INJECTION INTRAVENOUS; SUBCUTANEOUS at 12:57

## 2023-03-09 RX ADMIN — HEPARIN SODIUM 5000 UNIT(S): 5000 INJECTION INTRAVENOUS; SUBCUTANEOUS at 05:33

## 2023-03-09 RX ADMIN — Medication 650 MILLIGRAM(S): at 17:30

## 2023-03-09 RX ADMIN — Medication 650 MILLIGRAM(S): at 08:28

## 2023-03-09 RX ADMIN — Medication 650 MILLIGRAM(S): at 09:28

## 2023-03-09 RX ADMIN — Medication 1000 MILLIGRAM(S): at 01:26

## 2023-03-09 RX ADMIN — SODIUM CHLORIDE 125 MILLILITER(S): 9 INJECTION INTRAMUSCULAR; INTRAVENOUS; SUBCUTANEOUS at 01:12

## 2023-03-09 RX ADMIN — Medication 400 MILLIGRAM(S): at 00:56

## 2023-03-09 NOTE — PROGRESS NOTE ADULT - ASSESSMENT
41 yo F s/p robotic assisted left partial nephrectomy, POD#1, with Abd pain Likely Ileus  NPO, IVF  OOB encourage ambulation  No Narcotics till Bowel function  pederson D/C'd, passed TOV  trend labs  pain control with IV Tylenol  dvt ppx  Plan D/W Dr. Foster
41 yo F s/p robotic assisted left partial nephrectomy, doing well  ADAT  OOB encourage ambulation  pederson to gravity  strict i/o's q4hr to monitor uop  trend labs  pain control  dvt ppx  
41 yo F s/p robotic assisted left partial nephrectomy, POD#2 w/ hypophosphatemia   Replace phosphate  Tolerated Reg Diet  OOB encourage ambulation  No Narcotics till Bowel function encourage  pain control with PO Tylenol/Oxycodon  dvt ppx  DCP this evening after IV Phosphate   Plan D/W Dr. Foster

## 2023-03-09 NOTE — DISCHARGE NOTE PROVIDER - NSDCFUSCHEDAPPT_GEN_ALL_CORE_FT
Elder Foster  [facility] Physician [facility]  UROLOGY [address]  Scheduled Appointment: 03/29/2023

## 2023-03-09 NOTE — DISCHARGE NOTE NURSING/CASE MANAGEMENT/SOCIAL WORK - PATIENT PORTAL LINK FT
You can access the FollowMyHealth Patient Portal offered by [facility] by registering at the following website: [email or web address]. By joining [facility]’s FollowMyHealth portal, you will also be able to view your health information using other applications (apps) compatible with our system.

## 2023-03-09 NOTE — PROGRESS NOTE ADULT - SUBJECTIVE AND OBJECTIVE BOX
Status Post:  Robotic Assisted Partial nephrectomy    Post Operative Day #: 2    SUBJECTIVE:    Flatus: N             Bowel Movement: N  Pain (0-10): 4           Pain Control Adequate: Y  Nausea: N          Vomiting: N  Diarrhea/Constipation?  N  Chest Pain: N   SOB:  N    MEDICATIONS  (STANDING):  heparin   Injectable 5000 Unit(s) SubCutaneous every 8 hours  sodium chloride 0.9%. 1000 milliLiter(s) (125 mL/Hr) IV Continuous <Continuous>    MEDICATIONS  (PRN):  acetaminophen   IVPB .. 1000 milliGRAM(s) IV Intermittent once PRN Severe Pain (7 - 10)  morphine  - Injectable 4 milliGRAM(s) IV Push every 4 hours PRN Severe Pain (7 - 10)  ondansetron Injectable 4 milliGRAM(s) IV Push every 6 hours PRN Nausea  oxyCODONE    IR 5 milliGRAM(s) Oral every 6 hours PRN Moderate Pain (4 - 6)      OBJECTIVE:  Vital Signs Last 24 Hrs  T(C): 37.4 (09 Mar 2023 15:15), Max: 37.4 (09 Mar 2023 15:15)  T(F): 99.4 (09 Mar 2023 15:15), Max: 99.4 (09 Mar 2023 15:15)  HR: 96 (09 Mar 2023 15:15) (73 - 96)  BP: 105/65 (09 Mar 2023 15:15) (105/65 - 122/69)  BP(mean): 80 (09 Mar 2023 05:25) (80 - 80)  RR: 17 (09 Mar 2023 15:15) (17 - 18)  SpO2: 98% (09 Mar 2023 15:15) (94% - 98%)    Parameters below as of 09 Mar 2023 15:15  Patient On (Oxygen Delivery Method): room air      I&O's Detail    08 Mar 2023 07:01  -  09 Mar 2023 07:00  --------------------------------------------------------  IN:  Total IN: 0 mL    OUT:    Bulb (mL): 125 mL    Indwelling Catheter - Urethral (mL): 250 mL    Voided (mL): 470 mL  Total OUT: 845 mL    Total NET: -845 mL      09 Mar 2023 07:01  -  09 Mar 2023 16:33  --------------------------------------------------------  IN:  Total IN: 0 mL    OUT:    Bulb (mL): 45 mL  Total OUT: 45 mL    Total NET: -45 mL      PHYSICAL EXAM:  General: INAD A&Ox3  Abd: Soft, nt/nd, incision sites c/d/i with dermabond. FANNY with 45cc serosanguinous OP recorded in bulb   : UOP satisfactory  ext; nonedematous      Assessment/Plan: 
  Status Post:  robotic assisted left partial nephrectomy,    Post Operative Day #: 1    SUBJECTIVE:    Flatus: N             Bowel Movement: N  Pain (0-10):  7          Pain Control Adequate: Y  Nausea: N          Vomiting: N  Diarrhea/Constipation?  N  Chest Pain: N   SOB:  Y    MEDICATIONS  (STANDING):  heparin   Injectable 5000 Unit(s) SubCutaneous every 8 hours  pantoprazole  Injectable 40 milliGRAM(s) IV Push once  sodium chloride 0.9%. 1000 milliLiter(s) (125 mL/Hr) IV Continuous <Continuous>    MEDICATIONS  (PRN):  morphine  - Injectable 4 milliGRAM(s) IV Push every 4 hours PRN Severe Pain (7 - 10)  ondansetron Injectable 4 milliGRAM(s) IV Push every 6 hours PRN Nausea  oxyCODONE    IR 5 milliGRAM(s) Oral every 6 hours PRN Moderate Pain (4 - 6)      OBJECTIVE:  Vital Signs Last 24 Hrs  T(C): 36.7 (08 Mar 2023 15:13), Max: 37.2 (07 Mar 2023 21:45)  T(F): 98.1 (08 Mar 2023 15:13), Max: 99 (07 Mar 2023 22:45)  HR: 83 (08 Mar 2023 15:13) (65 - 88)  BP: 117/69 (08 Mar 2023 15:13) (95/60 - 117/69)  BP(mean): 70 (08 Mar 2023 06:09) (70 - 70)  RR: 18 (08 Mar 2023 15:13) (14 - 18)  SpO2: 100% (08 Mar 2023 15:13) (99% - 100%)    Parameters below as of 08 Mar 2023 15:13  Patient On (Oxygen Delivery Method): nasal cannula  O2 Flow (L/min): 2    I&O's Detail    07 Mar 2023 07:01  -  08 Mar 2023 07:00  --------------------------------------------------------  IN:    Other (mL): 1400 mL    sodium chloride 0.9%: 375 mL  Total IN: 1775 mL    OUT:    Blood Loss (mL): 20 mL    Bulb (mL): 45 mL    Indwelling Catheter - Urethral (mL): 1260 mL  Total OUT: 1325 mL    Total NET: 450 mL      08 Mar 2023 07:01  -  08 Mar 2023 15:45  --------------------------------------------------------  IN:  Total IN: 0 mL    OUT:    Indwelling Catheter - Urethral (mL): 250 mL  Total OUT: 250 mL    Total NET: -250 mL      PHYSICAL EXAM:  General: INAD A&Ox3  Abd: Soft, nt/nd, incision sites c/d/i with dermabond. FANNY with 45cc serosanguinous OP recorded in bulb   : UOP satisfactory  ext; nonedematous
41 yo F s/p robotic assisted left partial nephrectomy, doing well. Tolerating clears. Pain under control with current regimen.    ICU Vital Signs Last 24 Hrs  T(C): 36.7 (07 Mar 2023 18:08), Max: 37.1 (07 Mar 2023 17:30)  T(F): 98.1 (07 Mar 2023 18:08), Max: 98.8 (07 Mar 2023 17:30)  HR: 84 (07 Mar 2023 18:08) (62 - 84)  BP: 102/64 (07 Mar 2023 18:08) (95/60 - 110/67)  BP(mean): --  ABP: --  ABP(mean): --  RR: 18 (07 Mar 2023 18:08) (14 - 18)  SpO2: 100% (07 Mar 2023 18:08) (100% - 100%)    O2 Parameters below as of 07 Mar 2023 18:08  Patient On (Oxygen Delivery Method): nasal cannula  O2 Flow (L/min): 2      General: INAD A&Ox3  Abd: Soft, nt/nd, incision sites c/d/i with dermabond. FANNY with 20cc serosanguinous OP recorded in bulb   : UOP satisfactory  ext; nonedematous                          12.9   10.38 )-----------( 190      ( 07 Mar 2023 15:36 )             39.3   03-07    139  |  110<H>  |  11  ----------------------------<  105<H>  4.6   |  25  |  0.93    Ca    9.0      07 Mar 2023 15:36

## 2023-03-09 NOTE — DISCHARGE NOTE PROVIDER - CARE PROVIDER_API CALL
Elder Foster)  Urology  [address] [place] [address]  Phone: [phone]  Fax: [phone]  Scheduled Appointment: 03/29/2023

## 2023-03-09 NOTE — DISCHARGE NOTE PROVIDER - NSDCCPTREATMENT_GEN_ALL_CORE_FT
PRINCIPAL PROCEDURE  Procedure: Robotic-assisted partial left nephrectomy  Findings and Treatment:

## 2023-03-09 NOTE — DISCHARGE NOTE PROVIDER - NSDCMRMEDTOKEN_GEN_ALL_CORE_FT
oxyCODONE 5 mg oral tablet: 1 tab(s) orally every 6 hours, As needed, Moderate Pain (4 - 6) MDD:4 tabs  Vitamin B Complex 100:

## 2023-03-09 NOTE — DISCHARGE NOTE NURSING/CASE MANAGEMENT/SOCIAL WORK - NSDCPEFALRISK_GEN_ALL_CORE
For information on Fall & Injury Prevention, visit: [email or web address] OR  [email or web address] OR  https://www.cdc.gov/steadi/patient.html

## 2023-03-09 NOTE — DISCHARGE NOTE PROVIDER - HOSPITAL COURSE
Patient is a 42y old  Female who presents with a chief complaint of Left renal mass. s/p Robotic assisted partial nephrectomy, left. Pt POD#1 having abdominal pain and shoulder pain, Unable to tolerate diet. Kept NPO overnight. Pt improved pain in the morning then resumed clears liq diet. Advanced diet to regular on lunch time. patience tolerated well. Pt found to have hypophosphatemia today. replenished with IV phos. Stable to DC home after IV phos.

## 2023-03-22 NOTE — CHART NOTE - NSCHARTNOTEFT_GEN_A_CORE
CDI Query pathology update:    -Concur with Pathology Findings Noting: CLEAR-CELL RENAL CELL CARCINOMA  based on the following pathology report:      Surgical Pathology Report (03.07.23 @ 12:07)    Surgical Pathology Report:   ACCESSION No:  [id number]  Patient:   AZUCENA HOPKINS      Accession:                             [id number]    Collected Date/Time:                   3/7/2023 12:07 EST  Received Date/Time:                    3/8/2023 09:01 EST    Surgical Pathology Report - Auth (Verified)    Specimen(s) Submitted  1  Left renal mass    Final Diagnosis  Kidney (LEFT, partial nephrectomy):  -   CLEAR-CELL RENAL CELL CARCINOMA, Nuclear grade 1,  measuring 2.7 x 2.2 x 2.2 cm (see synoptic summary).  -   Associated cystic component and ectopic bone.    -   Renal parenchyma with focal calcification.  Verified by: TRISTA VEGA M.D.  (Electronic Signature)  Reported on: 03/09/23 14:35 [facility], [facility], [address] [place] [address]  Phone: [phone]   Fax: [phone]  _________________________________________________________________      Comment  All or portions of this case have undergone intradepartmental review.  (2)  (6)    This case represents a current or recent malignant diagnosis and as such  we will have the case reported to Tumor Registry.  Please be reminded  that all tumor registry cases must be accurately staged and tracked.    - For inpatients, please complete the tumor staging form on the patient's  chartbased on the clinical data which you have compiled.    - Please remind your office to respond promptly to the Tumor Registrar's  request for patient follow-up.    Synoptic Summary  1: Kidney - Nephrectomy, Partial or Radical  Specimen  Procedure:  Partial nephrectomy  Specimen Laterality:   Left  Tumor  Tumor Focality:   Unifocal  Tumor Size:  2.7 Centimeters (cm)  Histologic Type:   Clear cell renal cell carcinoma  Histologic Grade (WHO / ISUP):   G1 (nucleoli absent or  inconspicuous and basophilic at 400x magnification)  Tumor Extent:   Limited to kidney  Sarcomatoid Features:   Not identified  Rhabdoid Features:   Not identified  Tumor Necrosis:   Not identified  Margins  Margin Status:   All margins negative for invasive carcinoma  Regional Lymph Nodes  Regional Lymph Node Status:   Not applicable (no regional lymph  nodes submitted or found)  Pathologic Stage Classification (pTNM, AJCC 8th Edition)  Primary Tumor (pT):   pT1a  Regional Lymph Nodes (pN):   pN not assigned (no nodes submitted or  found)  Additional Findings  Additional Findings in Nonneoplastic Kidney:    Focal calcification    Best Tumor Blocks for Future Studies  Tumor Block(s):   [address] [place] [address] 2022 Q1 Release    Clinical Information  Left renal mass.    Gross Description  Received  in formalin labeled  "left renal mass"  is an 8 g, 3.2 x 2.7 x  2.2 cm partial nephrectomy specimen. The kidney measures 3.2 x 2.7 cm  and is excised to a depth of 1 cm. The kidney capsule is tan, smooth  to cystic with loosely attached perinephric fat. The cut surface is  remarkable for a 2.7 x 2.2 x 2.2 cm multicystic mass with minimal yellow  orange soft tissue component. The cysts are filled with yellow clear  gelatinous material and hemorrhagic fluid. The mass bulges thekidney  capsule without definitive invasion into the perinephric fat and is less  than 0.1 cm from the parenchymal margin. The background kidney parenchyma  is pale tan and soft.  Representative sections in 5 cassettes:  1A-1D: Representative cross-sections of mass with kidney capsule and  parenchymal margin  1E: Background kidney parenchyma    Vale Romero 03/08/2023 02:10 PM

## 2023-03-23 ENCOUNTER — APPOINTMENT (OUTPATIENT)
Dept: UROLOGY | Facility: CLINIC | Age: 43
End: 2023-03-23

## 2023-03-23 DIAGNOSIS — C64.2 MALIGNANT NEOPLASM OF LEFT KIDNEY, EXCEPT RENAL PELVIS: ICD-10-CM

## 2023-03-23 DIAGNOSIS — E83.39 OTHER DISORDERS OF PHOSPHORUS METABOLISM: ICD-10-CM

## 2023-03-29 ENCOUNTER — APPOINTMENT (OUTPATIENT)
Dept: UROLOGY | Facility: CLINIC | Age: 43
End: 2023-03-29
Payer: COMMERCIAL

## 2023-03-29 PROCEDURE — 99024 POSTOP FOLLOW-UP VISIT: CPT

## 2023-04-02 ENCOUNTER — NON-APPOINTMENT (OUTPATIENT)
Age: 43
End: 2023-04-02

## 2023-04-02 NOTE — PHYSICAL EXAM
[General Appearance - Well Developed] : well developed [General Appearance - Well Nourished] : well nourished [Urethral Meatus] : the meatus of the urethra showed no abnormalities [Skin Color & Pigmentation] : normal skin color and pigmentation [Edema] : no peripheral edema [] : no respiratory distress [Oriented To Time, Place, And Person] : oriented to person, place, and time [Normal Station and Gait] : the gait and station were normal for the patient's age [No Focal Deficits] : no focal deficits [No Palpable Adenopathy] : no palpable adenopathy [FreeTextEntry1] : well healed incision , scattered rash around the incision

## 2023-04-02 NOTE — ASSESSMENT
[FreeTextEntry1] : Patient is a 42 year old female presents for her post op appointment. She is now status post partial nephrectomy consistent with ccRCC. She has healed nicely from the surgery and has no complaints at this time. Pathology was reviewed with the patient. \par \par Plan : \par Cross sectional imaging in 4 months \par

## 2023-04-02 NOTE — HISTORY OF PRESENT ILLNESS
[FreeTextEntry1] : Patient is a 42 year old female  status post left nephrectomy who presents today to discuss pathology results. \par Pathology consistent with T1a clear cell carcinoma . \par She has healed nicely . She experienced minimal pain after surgery

## 2023-04-26 ENCOUNTER — APPOINTMENT (OUTPATIENT)
Dept: INTERNAL MEDICINE | Facility: CLINIC | Age: 43
End: 2023-04-26

## 2023-07-17 ENCOUNTER — APPOINTMENT (OUTPATIENT)
Dept: UROLOGY | Facility: CLINIC | Age: 43
End: 2023-07-17
Payer: COMMERCIAL

## 2023-07-17 PROCEDURE — 99213 OFFICE O/P EST LOW 20 MIN: CPT

## 2023-07-18 NOTE — PHYSICAL EXAM
[General Appearance - Well Developed] : well developed [General Appearance - Well Nourished] : well nourished [Skin Color & Pigmentation] : normal skin color and pigmentation [] : no respiratory distress [Normal Station and Gait] : the gait and station were normal for the patient's age [No Focal Deficits] : no focal deficits [FreeTextEntry1] : Laparoscopic incisions well healed. no hernia palpated  [Urinary Bladder Findings] : the bladder was normal on palpation [Edema] : no peripheral edema [Oriented To Time, Place, And Person] : oriented to person, place, and time [Mood] : the mood was normal [Affect] : the affect was normal [Not Anxious] : not anxious [No Palpable Adenopathy] : no palpable adenopathy

## 2023-07-18 NOTE — ASSESSMENT
[FreeTextEntry1] : Clear cell RCC (left) \par -imaging reviewed with no recurrence. \par -Will need repeat interval cross sectional imaging in 6 months (1/2024). plan for repeat MRI abdomen\par \par \par Adrenal nodule\par Patient followed by endocrine. has appointment in Jan 2024 as well \par \par RTC in 1/2024

## 2023-07-18 NOTE — HISTORY OF PRESENT ILLNESS
[FreeTextEntry1] : 44 yo female hx left renal mass s/p partial nephrectomy 3/2023 \par Path: T1a clear cell RCC\par \par Patient doing well since surgery. Has no urologic complaint. \par \par CT 7/2023: s/p left partial nephrectomy, no evidence of recurrence.  1.9cm left adrenal nodule \par history of cushings\par and she has an endocrinologist\par feels well

## 2023-07-24 NOTE — H&P PST ADULT - BP NONINVASIVE SYSTOLIC (MM HG)
Subsequent Medicare Annual Wellness Assessment:    Risk factors for conditions for which interventions are recommended:  1.  Continue your current dosing of Lantus.  2.  Change your Humalog sliding scale as below:  3.  Confer further with Dr. Vazquez when you see her again in August.    4.  Your blood pressure today is high.  Please check your pressures once daily for the next week.  Then send the readings into the office.      Change Humalog to breakfast, lunch and dinner plus sliding scale                            Blood sugar                         EXTRA Insulin (units)     141-200    Give 3 units                        201-250                               Give 4 units                        251-300                               Give 5 units                        301-350                               Give 6 units                        351-400                               Give 7 units                        >400                                    Call MD    Current other health providers:  Dr. Barth - Primary care physician  Charla Og - Audiologist   Dr. Jean - Endocrinologist  Dr. Cutler - Neurosurgeon    Current suppliers of other medical goods and services:  [employer] - [place], [place]    Evidence of cognitive impairment?  You have cognitive change.      Advice/referrals for health education/preventive counseling services or programs:  1.  Regular exercise with walking or cycling if you are capable for 30 minutes five days weekly is recommended.  2.  Consider seeing Brandy Noguera, our dietician at the [place] office, should you desire diet counseling.    Screening schedule/checklist for next 5-10 years:  1.  I recommend that you get a flu shot yearly.     2.  You have completed the Shingrix vaccination series.    3.  You are fully vaccination for pneumococcus.  You had received the Prevnar 20.  4.  Tetanus vaccine is recommended.  However, Medicare does not cover this vaccination  unless you lacerate/cut yourself.  Should you desire this vaccination, please check on pricing/availability at your local pharmacy.  This can be completed and covered by insurance if/when you have a deep skin laceration or puncture.  5.  Follow the guidance from your GI team in regards to planning your next colonoscopy.  6.  You may schedule a mammogram for late October.  Radiology (CT/MRI/Mammogram/US/DEXA) [phone].     Follow-up:  Follow-up with Dr. Barth in 6 months.  Call with any questions or concerns.    111

## 2023-10-11 NOTE — CDI QUERY NOTE - NSCDIOTHERTXTBX_GEN_ALL_CORE_HH
Further Clarification is needed on Documentation noting "likely Ileus".    Patient is a 42y old  Female who presents with a chief complaint of Left renal mass. s/p Robotic assisted partial nephrectomy, left. Pt POD#1 having abdominal pain and shoulder pain, Unable to tolerate diet. Kept NPO overnight. Pt improved pain in the morning then resumed clears liq diet. On 3/8/23 provider noted "abdominal pain - likely ileus".  Patient was later advanced to regular diet and patient tolerated well. Pt found to have hypophosphatemia which was replenished with IV phos.     Please clarify after further study, evaluation, review of pathology findings and treatment if "Likely Ileus" was:    - Ileus was Ruled Out  - Ileus was Ruled in and remained Differential Diagnosis at Discharge  -Other Please Specify  -Not Clinically Significant    Chart Documentation Location in Legal Medical Record:    3/8/23 - Progress note:    Post Operative Day #: 1  SUBJECTIVE:  Flatus: N             Bowel Movement: N  Pain (0-10):  7          Pain Control Adequate: Y  Nausea: N          Vomiting: N  Diarrhea/Constipation?  N  Chest Pain: N   SOB:  Y    PHYSICAL EXAM:  General: INAD A&Ox3  Abd: Soft, nt/nd, incision sites c/d/i with dermabond. FANNY with 45cc serosanguinous OP recorded in bulb   : UOP satisfactory  ext; nonedematous    Assessment and Plan:   Assessment	  43 yo F s/p robotic assisted left partial nephrectomy, POD#1, with Abd pain Likely Ileus  NPO, IVF  OOB encourage ambulation  No Narcotics till Bowel function  pederson D/C'd, passed TOV  trend labs  pain control with IV Tylenol  dvt ppx  Plan D/W Dr. Foster    Electronic Signatures:  Garry Beasley)  (Signed 08-Mar-2023 15:50)  	Authored: Progress Note, Subjective and Objective, Assessment and Plan
Further clarification is required regarding pathology findings.  No documentation addressing the pathology findings were found in the medical record at the time of this review.    Chart Documentation:    Discharge provider note 3/9/2023  Left renal mass. s/p Robotic assisted partial nephrectomy, left.    Pathology Findings:  Surgical Pathology Report:   ACCESSION No: [id number]   Patient: AZUCENA HOPKINS   Collected Date/Time: 3/7/2023 12:07 EST   Received Date/Time: 3/8/2023 09:01 EST   Surgical Pathology Report - Auth (Verified)   Specimen(s) Submitted: Left renal mass   Final Diagnosis: Kidney (LEFT, partial nephrectomy) - CLEAR-CELL RENAL CELL CARCINOMA    Please clarify after further study, evaluation, and treatment if you concur with the Pathology findings.    -Concur with Pathology Findings Noting: CLEAR-CELL RENAL CELL CARCINOMA  -Do Not Concur with Pathology Findings  -Other Please Specify  -Not Clinically Significant

## 2024-01-16 RX ORDER — DEXAMETHASONE 1 MG/1
1 TABLET ORAL
Qty: 1 | Refills: 0 | Status: ACTIVE | COMMUNITY
Start: 2023-01-24 | End: 1900-01-01

## 2024-01-24 ENCOUNTER — APPOINTMENT (OUTPATIENT)
Dept: UROLOGY | Facility: CLINIC | Age: 44
End: 2024-01-24
Payer: COMMERCIAL

## 2024-01-24 ENCOUNTER — RESULT REVIEW (OUTPATIENT)
Age: 44
End: 2024-01-24

## 2024-01-24 DIAGNOSIS — C64.2 MALIGNANT NEOPLASM OF LEFT KIDNEY, EXCEPT RENAL PELVIS: ICD-10-CM

## 2024-01-24 DIAGNOSIS — N28.89 OTHER SPECIFIED DISORDERS OF KIDNEY AND URETER: ICD-10-CM

## 2024-01-24 PROCEDURE — 99213 OFFICE O/P EST LOW 20 MIN: CPT

## 2024-01-25 LAB
CORTIS SERPL-MCNC: 4 UG/DL
CREAT SERPL-MCNC: 0.74 MG/DL
EGFR: 102 ML/MIN/1.73M2
T4 FREE SERPL-MCNC: 1.4 NG/DL
TSH SERPL-ACNC: 1.5 UIU/ML

## 2024-01-26 LAB — ALDOSTERONE SERUM: 32.2 NG/DL

## 2024-01-26 NOTE — HISTORY OF PRESENT ILLNESS
[FreeTextEntry1] : 43F here for followup s/p left robotic partial nephrectomy 3/2023 pT1a doing well   CXR no evidence of recurrence CT with no evidence recurrence. The left adrenal nodule is stable. imaging was obtained in [place]

## 2024-01-26 NOTE — ASSESSMENT
[FreeTextEntry1] : 43F s/p  3/2023 left partial nephrectomy, pT1a  --doing well --CT reviewed as per report    was done in [place]    healed    no evidence recurrence --repeat in 6 months RBUS --followup in 6 months

## 2024-02-02 LAB — RENIN ACTIVITY, PLASMA: 0.91 NG/ML/HR

## 2024-02-05 LAB — DHEA-SULFATE, SERUM: 24 UG/DL

## 2024-02-06 LAB
METANEPHRINE, PL: <25 PG/ML
NORMETANEPHRINE, PL: 106.6 PG/ML

## 2024-02-26 ENCOUNTER — APPOINTMENT (OUTPATIENT)
Dept: ENDOCRINOLOGY | Facility: CLINIC | Age: 44
End: 2024-02-26
Payer: COMMERCIAL

## 2024-02-26 VITALS
OXYGEN SATURATION: 99 % | HEIGHT: 69 IN | DIASTOLIC BLOOD PRESSURE: 65 MMHG | WEIGHT: 137.44 LBS | BODY MASS INDEX: 20.36 KG/M2 | SYSTOLIC BLOOD PRESSURE: 110 MMHG | HEART RATE: 78 BPM

## 2024-02-26 DIAGNOSIS — E27.8 OTHER SPECIFIED DISORDERS OF ADRENAL GLAND: ICD-10-CM

## 2024-02-26 PROCEDURE — 99214 OFFICE O/P EST MOD 30 MIN: CPT

## 2024-02-26 NOTE — PHYSICAL EXAM
[Alert] : alert [Well Nourished] : well nourished [No Acute Distress] : no acute distress [Well Developed] : well developed [Normal Sclera/Conjunctiva] : normal sclera/conjunctiva [EOMI] : extra ocular movement intact [No Proptosis] : no proptosis [Normal Oropharynx] : the oropharynx was normal [Thyroid Not Enlarged] : the thyroid was not enlarged [No Respiratory Distress] : no respiratory distress [No Accessory Muscle Use] : no accessory muscle use [Clear to Auscultation] : lungs were clear to auscultation bilaterally [Normal S1, S2] : normal S1 and S2 [Normal Rate] : heart rate was normal [Regular Rhythm] : with a regular rhythm [No Edema] : no peripheral edema [Pedal Pulses Normal] : the pedal pulses are present [Normal Bowel Sounds] : normal bowel sounds [Not Tender] : non-tender [Not Distended] : not distended [Soft] : abdomen soft [Normal Anterior Cervical Nodes] : no anterior cervical lymphadenopathy [No Spinal Tenderness] : no spinal tenderness [Spine Straight] : spine straight [No Stigmata of Cushings Syndrome] : no stigmata of Cushings Syndrome [Normal Gait] : normal gait [No Rash] : no rash [Normal Strength/Tone] : muscle strength and tone were normal [Normal Reflexes] : deep tendon reflexes were 2+ and symmetric [No Tremors] : no tremors [Oriented x3] : oriented to person, place, and time [Acanthosis Nigricans] : no acanthosis nigricans

## 2024-02-26 NOTE — HISTORY OF PRESENT ILLNESS
[FreeTextEntry1] : 43 year F here for f/up of adrenal adenoma:   Summary: Date of Imagin2022 Modality:  CT KUB, done in outside, left side 2 x 1.3 cm, 78% absolute washout and 48% relative washout  Patient reported that [place] when she had round face, thin legs, striae, and had right adrenal adenoma removed   She had interval CT scan done which suggested now a left sided adenoma   History of HTN: No    No new complaints.

## 2024-06-28 ENCOUNTER — APPOINTMENT (OUTPATIENT)
Dept: ULTRASOUND IMAGING | Facility: CLINIC | Age: 44
End: 2024-06-28
Payer: COMMERCIAL

## 2024-06-28 ENCOUNTER — OUTPATIENT (OUTPATIENT)
Dept: OUTPATIENT SERVICES | Facility: HOSPITAL | Age: 44
LOS: 1 days | End: 2024-06-28

## 2024-06-28 DIAGNOSIS — E89.6 POSTPROCEDURAL ADRENOCORTICAL (-MEDULLARY) HYPOFUNCTION: Chronic | ICD-10-CM

## 2024-06-28 PROCEDURE — 76770 US EXAM ABDO BACK WALL COMP: CPT | Mod: 26

## 2024-09-04 ENCOUNTER — APPOINTMENT (OUTPATIENT)
Dept: UROLOGY | Facility: CLINIC | Age: 44
End: 2024-09-04
Payer: COMMERCIAL

## 2024-09-04 VITALS
BODY MASS INDEX: 20.29 KG/M2 | WEIGHT: 137 LBS | SYSTOLIC BLOOD PRESSURE: 114 MMHG | OXYGEN SATURATION: 100 % | HEART RATE: 64 BPM | HEIGHT: 69 IN | DIASTOLIC BLOOD PRESSURE: 73 MMHG

## 2024-09-04 PROCEDURE — 99213 OFFICE O/P EST LOW 20 MIN: CPT

## 2024-09-08 NOTE — HISTORY OF PRESENT ILLNESS
[FreeTextEntry1] : 44 year old female presents today for post op examination he is s/p left robotic partial nephrectomy. Path shows CLEAR-CELL RENAL CELL CARCINOMA, Nuclear grade 1.  Report to be feeling well.

## 2024-09-08 NOTE — ASSESSMENT
[FreeTextEntry1] : US of kidney and Bladder reviewed in office today. Plan is CT Abdomen and Chest Xray in January 2025 Follow up in office January 2025 All questions answered and patient agreeable with plan. Can return to regular activity no restrictions. 20 min discussion with kidney cancer followup, imaging review and future followup planning

## 2025-02-07 ENCOUNTER — APPOINTMENT (OUTPATIENT)
Dept: ENDOCRINOLOGY | Facility: CLINIC | Age: 45
End: 2025-02-07